# Patient Record
Sex: FEMALE | Race: BLACK OR AFRICAN AMERICAN | Employment: FULL TIME | URBAN - METROPOLITAN AREA
[De-identification: names, ages, dates, MRNs, and addresses within clinical notes are randomized per-mention and may not be internally consistent; named-entity substitution may affect disease eponyms.]

---

## 2020-11-03 ENCOUNTER — HOSPITAL ENCOUNTER (EMERGENCY)
Age: 30
Discharge: HOME | End: 2020-11-03
Attending: EMERGENCY MEDICINE
Payer: COMMERCIAL

## 2020-11-03 VITALS
DIASTOLIC BLOOD PRESSURE: 67 MMHG | HEIGHT: 65 IN | TEMPERATURE: 98.8 F | HEART RATE: 84 BPM | RESPIRATION RATE: 16 BRPM | SYSTOLIC BLOOD PRESSURE: 120 MMHG | OXYGEN SATURATION: 98 %

## 2020-11-03 PROCEDURE — 99283 EMERGENCY DEPT VISIT LOW MDM: CPT

## 2020-11-03 SDOH — HEALTH STABILITY: MENTAL HEALTH: HOW OFTEN DO YOU HAVE A DRINK CONTAINING ALCOHOL?: NEVER

## 2020-11-03 NOTE — ED PROVIDER NOTES
CHIEF COMPLAINT  Other (pt states yesterday her arm started to feel as if she slept on it wrong \"it feels cold but its not cold\" and feeling hasnt gone away since started yesterday. pt has normal feeling and movement)      HISTORY OF PRESENT ILLNESS  Santana Aggarwal is a 35 y.o. female who is right-handed, who presents to the ED complaining of feeling as if her right arm from her elbow to the medial side of her right arm and hand feels as if it is cold but does not feel cold it is it feels like she is asleep like she slept on it. It comes and goes. Sometimes she has the feeling in her shoulder. She has no weakness and no pain with movement. Denies injury. She works as a manager at [employer]. No other complaints, modifying factors or associated symptoms. Nursing notes reviewed. No past medical history on file. No past surgical history on file. No family history on file.   Social History     Socioeconomic History    Marital status: Not on file     Spouse name: Not on file    Number of children: Not on file    Years of education: Not on file    Highest education level: Not on file   Occupational History    Not on file   Social Needs    Financial resource strain: Not on file    Food insecurity     Worry: Not on file     Inability: Not on file    Transportation needs     Medical: Not on file     Non-medical: Not on file   Tobacco Use    Smoking status: Not on file   Substance and Sexual Activity    Alcohol use: Not on file    Drug use: Not on file    Sexual activity: Not on file   Lifestyle    Physical activity     Days per week: Not on file     Minutes per session: Not on file    Stress: Not on file   Relationships    Social connections     Talks on phone: Not on file     Gets together: Not on file     Attends Buddhist service: Not on file     Active member of club or organization: Not on file     Attends meetings of clubs or organizations: Not on file     Relationship status: Not on file    Intimate partner violence     Fear of current or ex partner: Not on file     Emotionally abused: Not on file     Physically abused: Not on file     Forced sexual activity: Not on file   Other Topics Concern    Not on file   Social History Narrative    Not on file     No current facility-administered medications for this encounter. No current outpatient medications on file. No Known Allergies    REVIEW OF SYSTEMS  6 systems reviewed, pertinent positives per HPI otherwise noted to be negative    PHYSICAL EXAM  BP (!) 112/59   Pulse 83   Temp 98.8 °F (37.1 °C)   Resp 16   Ht 5' 5\" (1.651 m)   SpO2 98%   GENERAL APPEARANCE: Awake and alert. Cooperative. No acute distress. HEAD: Normocephalic. Atraumatic. EYES: PERRL. EOM's grossly intact. ENT: Mucous membranes are moist.   NECK: Supple. Normal ROM. HEART: RRR,  no murmur/rubs/gallop  CHEST/LUNGS: Breathing is unlabored. Speaking comfortably in full sentences. EXTREMITIES: .  Positive Phalen's and Tinel's on exam with significant exacerbation of symptoms of palpation of ulnar nerve distribution of the right wrist as well as upon palpation and tapping of the ulnar nerve distribution at the right elbow. . No acute deformities. All extremities neurovascularly intact. SKIN: Warm and dry. NEUROLOGICAL: Alert and oriented. Normal coordination. Gait normal.       ED COURSE/MDM  Patient seen and evaluated. We will give the patient a wrist brace tear to wear home, positive Phalen's and Tinel's on exam with significant exacerbation of symptoms on palpation over the distribution ulnar nerve in the right wrist.  I do feel patient can be safely discharged to home. Recommend follow up with PCP in 2-3 days for re-evaluation. Reasons to RT ED discussed. Patient expresses understanding and is in agreement with plan. CLINICAL IMPRESSION  1.  Cubital tunnel syndrome on right        Blood pressure (!) 112/59, pulse 83, temperature 98.8 °F (37.1 °C), resp. rate 16, height 5' 5\" (1.651 m), SpO2 98 %. DISPOSITION  Patient was discharged to home in good condition. This chart was generated in part by using Dragon Dictation system and may contain errors related to that system including errors in grammar, punctuation, and spelling, as well as words and phrases that may be inappropriate. When dictating, effort is made to correct spelling/grammar errors. If there are any questions or concerns please feel free to contact the dictating provider for clarification.      Reyna Crowell,   ATTENDING, [address], DO  11/03/20 Babak Frost, DO  11/03/20 [id number]

## 2020-11-04 ENCOUNTER — TELEPHONE (OUTPATIENT)
Dept: OTHER | Age: 30
End: 2020-11-04

## 2020-11-04 NOTE — TELEPHONE ENCOUNTER
Patient called requesting PCP,  SW called physician referral line and obtained appointment for next Monday November 9th at 1pm-  Text information to patient.

## 2020-11-06 ENCOUNTER — OFFICE VISIT (OUTPATIENT)
Dept: ORTHOPEDIC SURGERY | Age: 30
End: 2020-11-06
Payer: COMMERCIAL

## 2020-11-06 VITALS — HEIGHT: 65 IN

## 2020-11-06 PROCEDURE — G8484 FLU IMMUNIZE NO ADMIN: HCPCS | Performed by: PHYSICIAN ASSISTANT

## 2020-11-06 PROCEDURE — 99203 OFFICE O/P NEW LOW 30 MIN: CPT | Performed by: PHYSICIAN ASSISTANT

## 2020-11-06 PROCEDURE — G8421 BMI NOT CALCULATED: HCPCS | Performed by: PHYSICIAN ASSISTANT

## 2020-11-06 PROCEDURE — 1036F TOBACCO NON-USER: CPT | Performed by: PHYSICIAN ASSISTANT

## 2020-11-06 PROCEDURE — G8427 DOCREV CUR MEDS BY ELIG CLIN: HCPCS | Performed by: PHYSICIAN ASSISTANT

## 2020-11-06 NOTE — PROGRESS NOTES
Ms. Cha Frost is a 35 y.o. right handed woman  who is seen today in Hand Surgical Consultation at the request of No primary care provider on file. .    She presents today regarding Right symptoms which have been present for approximately 5 days. A history of antecedent trauma or injury is Absent. She reports symptoms to include moderate numbness & tingling in the Ulnar Innervated Digits. Neurologic symptoms do Frequently awaken her from sleep. She reports no pain located in the Medial right elbow, with retrograde radiation . Symptoms are worsening over time. Previous treatment has included conservative measures and splinting of the right wrist.  She does not claim relation of her symptoms to her required work activities. She has not undergone electrodiagnostic testing. I have today reviewed with Cha Frost the clinically relevant, past medical history, medications, allergies,  family history, social history, and Review Of Systems & I have documented any details relevant to today's presenting complaints in my history above. Ms. Leyda Ga self-reported past medical history, medications, allergies,  family history, social history, and Review Of Systems have been scanned into the chart under the \"Media\" tab. Physical Exam:  Ms. Leyda Ga most recent vitals:  Vitals  Height: 5' 5\" (165.1 cm)    She is well nourished, oriented to person, place & time. She demonstrates appropriate mood and affect as well as normal gait and station. Skin: Normal in appearance, Normal Color and Free of Lesions Bilaterally   Digital range of motion is Full and equal bilaterally bilaterally  Wrist range of motion is Full and equal bilaterally bilaterally  Elbow range of motion is Full and equal bilaterally bilaterally  Sensation is subjectively tingling in the Ulnar Innervated Digits and objectively present in the same distribution on the Right, normal on the Left.   All other digits show normal sensation  Vascular examination reveals normal, good capillary refill and good color bilaterally  Swelling is minimal about the elbow on the Right, normal on the Left  There is no evidence of gross joint instability bilaterally. Muscular strength is clinically appropriate bilaterally. Examination for Cubital Tunnel Syndrome on the right shows mild tenderness to palpation at the Medial epicondyle. The Ulnar Nerve rests behind the medial epicondyle without subluxation upon elbow flexion. Elbow flexion-compression test is Negative, and there is an active Tinnel's Sign over the Cubital Tunnel . The Ulnar Nerve innervated intrinsic musculature is not atrophied & weakened. Examination for Carpal Tunnel Syndrome shows Carpal Tunnel Compression Test to be Mildly Positive on the right & Negative on the left. The patient displays no baseline symptoms to potentially confound the exam.  The thenar musculature is not atrophied & weakened. Examination for Stenosing Tenosynovitis demonstrates no evidence of tenderness, thickening or nodularity at the A-1 pulleys of the digits bilaterally. There no palpable triggering or any finger or thumb. Impression:  Ms. Rolando Ward is showing clinical evidence of Ulnar Nerve entrapment at the Cubital Tunnel and presents requesting further treatment. Plan:    I have had a thorough discussion with Ms. Rolando Ward regarding the treatment options available for her initially presenting right  cubital tunnel syndrome, which is causing her significant symptoms and difficulty. I have outlined for Ms. Rolando Ward the risk, benefits and consequences of the various treatment modalities, including a reasonable expectation for the long term success of each. We have discussed the likelihood that further, more aggressive treatment may be required for her current presenting condition.   Based upon our current discussion and a reasonable understating of the options available to her, Ms. Annia Ellsworth has selected to proceed with a conservative plan of treatment consisting of: attention to elbow positioning and pressure,  activity modification, and the judicious use of over-the-counter anti-inflammatory medications if allowed by her primary care physician. Instructions were given regarding the use of other modalities as appropriate. I have clearly explained to her that the above outlined treatment plan should not be expected to 'cure' her  cubital tunnel syndrome, but we are rather treating the symptoms with which she presents. She has understood that in order to achieve more durable relief of her symptoms and to prevent future worsening or further damage, that definitive surgical treatment would be required. Ms. Annia Ellsworth  voiced an appropriate understanding of our discussion, the options available to her, and of the expectations of her selected  treatment. I will ask Ms. Annia Ellsworth. to undergo electrodiagnostic studies of the symptomatic right side. I will ask that she schedule a follow-up appointment with me to review the results of this study after it has been completed. I have also discussed with Ms. Anina Ellsworth  the other treatment options available to her  for this condition. We have today selected to proceed with conservative management. She and I have agreed that if our current course of conservative treatment does not prove to be effective over the short term future, that she will schedule a follow-up appointment to discuss and select an alternate course of therapy including possibly injection or surgical treatment. Ms. Annia Ellsworth has been given a full verbal list of instructions and precautions related to her present condition. I have asked her to followup with me in the office at the prescribed time.  She is also specifically requested to call or return to the office sooner if her symptoms change or worsen prior to the next scheduled appointment.

## 2020-11-09 ENCOUNTER — OFFICE VISIT (OUTPATIENT)
Dept: INTERNAL MEDICINE CLINIC | Age: 30
End: 2020-11-09
Payer: COMMERCIAL

## 2020-11-09 VITALS
DIASTOLIC BLOOD PRESSURE: 61 MMHG | OXYGEN SATURATION: 96 % | SYSTOLIC BLOOD PRESSURE: 101 MMHG | BODY MASS INDEX: 32.46 KG/M2 | WEIGHT: 194.8 LBS | RESPIRATION RATE: 16 BRPM | TEMPERATURE: 98.4 F | HEIGHT: 65 IN | HEART RATE: 75 BPM

## 2020-11-09 PROBLEM — M54.41 CHRONIC MIDLINE LOW BACK PAIN WITH BILATERAL SCIATICA: Status: ACTIVE | Noted: 2020-11-09

## 2020-11-09 PROBLEM — G89.29 CHRONIC MIDLINE LOW BACK PAIN WITH BILATERAL SCIATICA: Status: ACTIVE | Noted: 2020-11-09

## 2020-11-09 PROBLEM — E66.811 CLASS 1 OBESITY DUE TO EXCESS CALORIES WITHOUT SERIOUS COMORBIDITY WITH BODY MASS INDEX (BMI) OF 32.0 TO 32.9 IN ADULT: Status: ACTIVE | Noted: 2020-11-09

## 2020-11-09 PROBLEM — M54.42 CHRONIC MIDLINE LOW BACK PAIN WITH BILATERAL SCIATICA: Status: ACTIVE | Noted: 2020-11-09

## 2020-11-09 PROBLEM — F41.1 GENERALIZED ANXIETY DISORDER: Status: ACTIVE | Noted: 2020-11-09

## 2020-11-09 PROBLEM — E66.09 CLASS 1 OBESITY DUE TO EXCESS CALORIES WITHOUT SERIOUS COMORBIDITY WITH BODY MASS INDEX (BMI) OF 32.0 TO 32.9 IN ADULT: Status: ACTIVE | Noted: 2020-11-09

## 2020-11-09 PROCEDURE — G8427 DOCREV CUR MEDS BY ELIG CLIN: HCPCS | Performed by: INTERNAL MEDICINE

## 2020-11-09 PROCEDURE — 1036F TOBACCO NON-USER: CPT | Performed by: INTERNAL MEDICINE

## 2020-11-09 PROCEDURE — G8417 CALC BMI ABV UP PARAM F/U: HCPCS | Performed by: INTERNAL MEDICINE

## 2020-11-09 PROCEDURE — G8484 FLU IMMUNIZE NO ADMIN: HCPCS | Performed by: INTERNAL MEDICINE

## 2020-11-09 PROCEDURE — 99204 OFFICE O/P NEW MOD 45 MIN: CPT | Performed by: INTERNAL MEDICINE

## 2020-11-09 RX ORDER — SERTRALINE HYDROCHLORIDE 25 MG/1
TABLET, FILM COATED ORAL
Qty: 60 TABLET | Refills: 1 | Status: SHIPPED | OUTPATIENT
Start: 2020-11-21 | End: 2021-01-18 | Stop reason: SDUPTHER

## 2020-11-09 RX ORDER — NAPROXEN 250 MG/1
250 TABLET ORAL 2 TIMES DAILY WITH MEALS
Qty: 20 TABLET | Refills: 0 | Status: SHIPPED | OUTPATIENT
Start: 2020-11-09 | End: 2021-01-18

## 2020-11-09 SDOH — ECONOMIC STABILITY: INCOME INSECURITY: HOW HARD IS IT FOR YOU TO PAY FOR THE VERY BASICS LIKE FOOD, HOUSING, MEDICAL CARE, AND HEATING?: SOMEWHAT HARD

## 2020-11-09 SDOH — ECONOMIC STABILITY: FOOD INSECURITY: WITHIN THE PAST 12 MONTHS, THE FOOD YOU BOUGHT JUST DIDN'T LAST AND YOU DIDN'T HAVE MONEY TO GET MORE.: NEVER TRUE

## 2020-11-09 SDOH — ECONOMIC STABILITY: FOOD INSECURITY: WITHIN THE PAST 12 MONTHS, YOU WORRIED THAT YOUR FOOD WOULD RUN OUT BEFORE YOU GOT MONEY TO BUY MORE.: NEVER TRUE

## 2020-11-09 SDOH — ECONOMIC STABILITY: TRANSPORTATION INSECURITY
IN THE PAST 12 MONTHS, HAS LACK OF TRANSPORTATION KEPT YOU FROM MEETINGS, WORK, OR FROM GETTING THINGS NEEDED FOR DAILY LIVING?: NO

## 2020-11-09 SDOH — ECONOMIC STABILITY: TRANSPORTATION INSECURITY
IN THE PAST 12 MONTHS, HAS THE LACK OF TRANSPORTATION KEPT YOU FROM MEDICAL APPOINTMENTS OR FROM GETTING MEDICATIONS?: NO

## 2020-11-09 ASSESSMENT — ENCOUNTER SYMPTOMS
COUGH: 0
BACK PAIN: 1
BLOOD IN STOOL: 0
SINUS PAIN: 0
RHINORRHEA: 0
NAUSEA: 0
WHEEZING: 0
ABDOMINAL DISTENTION: 0
VOMITING: 0
ABDOMINAL PAIN: 0
EYE PAIN: 0
DIARRHEA: 0
EYE DISCHARGE: 0
CHEST TIGHTNESS: 0
PHOTOPHOBIA: 0
SHORTNESS OF BREATH: 0
CONSTIPATION: 0

## 2020-11-09 ASSESSMENT — PATIENT HEALTH QUESTIONNAIRE - PHQ9
2. FEELING DOWN, DEPRESSED OR HOPELESS: 1
1. LITTLE INTEREST OR PLEASURE IN DOING THINGS: 0
SUM OF ALL RESPONSES TO PHQ9 QUESTIONS 1 & 2: 1
SUM OF ALL RESPONSES TO PHQ QUESTIONS 1-9: 1

## 2021-01-17 NOTE — PROGRESS NOTES
2021    TELEHEALTH EVALUATION -- Audio/Visual (During [facility]-59 public health emergency)    HPI:    Lawson Loza (:  1990) has requested an audio/video evaluation for the following concern(s):    The pt is a 35YOF with PMH of obesity, anxiety and low back pain is being seen via virtual visit to follow up on her anxiety and other health issues  She works as customer service as a manager. No smoking, occiosnal marijuana use. ETOH social. Living at home with her kids. She is sexally acitve uses condoms. LMP mid October. Generalized Anxiety disorder  The pt has this problem since . Fluctuating worse with COVID situation  The pt has persistent worrying and stress  about every day life situations and minor issues. Poor sleep, fatigue, difficulty relaxing. She was started on sertraline and feel she is much improved then before. She still however feels that she can be better and there is room for improvement in terms of her symptoms. She does get aggravated quickly. Lower Back pain  For seven years, better with naproxen. It is much I,mporved and she is able to function normally  Pain stated as tightness in the middle and then radiates down to he back of legs on either side. Feels like bhavesh horse, 10/10, sharp and shooting, It is made better by ibuprofen to some extent. It is made worse by cold. She works standing up and on her feet a a lot which also worsens it. Sitting also puts pressure and can bring it on. No injury  Or tauma nio heavy weight lifting. Arm numbness new problem worsening  Started about a month back and she went to the Ed as she felt that her forearm below the elbow was numb. An EMG was ordered but she has not been able to get it done. She does repeated hand movement at work which include cutting and working at [employer]. She says she sleep with her hand under her face and when she does that her numbness gets worse and also with work.  It is better in certain positions History     Tobacco Use    Smoking status: Never Smoker    Smokeless tobacco: Never Used   Substance Use Topics    Alcohol use: Yes     Frequency: Never     Comment: social and occ     Drug use: Yes     Types: Marijuana     Comment: occ        No Known Allergies, No past medical history on file., No past surgical history on file.,   Social History     Tobacco Use    Smoking status: Never Smoker    Smokeless tobacco: Never Used   Substance Use Topics    Alcohol use: Yes     Frequency: Never     Comment: social and occ     Drug use: Yes     Types: Marijuana     Comment: occ   , No family history on file.,   There is no immunization history on file for this patient.,   Health Maintenance   Topic Date Due    Hepatitis C screen  1990    Varicella vaccine (1 of 2 - 2-dose childhood series) 09/16/1992    HIV screen  09/16/2006    Cervical cancer screen  09/16/2012    Flu vaccine (1) 11/09/2021 (Originally 9/1/2020)    DTaP/Tdap/Td vaccine (2 - Td) 06/25/2029    Hepatitis A vaccine  Aged Out    Hepatitis B vaccine  Aged Out    Hib vaccine  Aged Out    Meningococcal (ACWY) vaccine  Aged Out    Pneumococcal 0-64 years Vaccine  Aged Out       PHYSICAL EXAMINATION:  [ INSTRUCTIONS:  \"[x]\" Indicates a positive item  \"[]\" Indicates a negative item  -- DELETE ALL ITEMS NOT EXAMINED]  Vital Signs: (As obtained by patient/caregiver or practitioner observation)    Blood pressure-  Heart rate-    Respiratory rate-    Temperature-  Pulse oximetry-     Constitutional: [x] Appears well-developed and well-nourished [x] No apparent distress      [] Abnormal-   Mental status  [x] Alert and awake  [x] Oriented to person/place/time [x]Able to follow commands      Eyes:  EOM    [x]  Normal  [] Abnormal-  Sclera  [x]  Normal  [] Abnormal -         Discharge [x]  None visible  [] Abnormal -    HENT:   [x] Normocephalic, atraumatic.   [] Abnormal   [x] Mouth/Throat: Mucous membranes are moist.     External Ears [x] Normal [] Abnormal-     Neck: [x] No visualized mass     Pulmonary/Chest: [x] Respiratory effort normal.  [x] No visualized signs of difficulty breathing or respiratory distress        [] Abnormal-      Musculoskeletal:   [x] Normal gait with no signs of ataxia         [x] Normal range of motion of neck        [] Abnormal-       Neurological:        [x] No Facial Asymmetry (Cranial nerve 7 motor function) (limited exam to video visit)          [x] No gaze palsy        [] Abnormal-         Skin:        [x] No significant exanthematous lesions or discoloration noted on facial skin         [] Abnormal-            Psychiatric:       [x] Normal Affect [x] No Hallucinations        [] Abnormal-     Other pertinent observable physical exam findings-     ASSESSMENT/PLAN:  1. Generalized anxiety disorder  Adjusted the dose of the sertraline to 75 mg daily    2. Chronic midline low back pain with bilateral sciatica  Prn naproxen. The pt was able to tolerate it in the past  She does need blood tested will do it on her next visit    3. Numbness of upper extremity  EMG pending prn naproxen      Return in about 2 months (around 3/18/2021) for preventive visit. Tian Ricks is a 35 y.o. female being evaluated by a Virtual Visit (video visit) encounter to address concerns as mentioned above. A caregiver was present when appropriate. Due to this being a TeleHealth encounter (During [facility]-02 public health emergency), evaluation of the following organ systems was limited: Vitals/Constitutional/EENT/Resp/CV/GI//MS/Neuro/Skin/Heme-Lymph-Imm. Pursuant to the emergency declaration under the [address] [place] [address], [address] [place] [address] authority and the [employer] Act, this Virtual Visit was conducted with patient's (and/or legal guardian's) consent, to reduce the patient's risk of exposure to COVID-19 and provide necessary medical care.   The patient (and/or legal guardian) has also been advised to contact this office for worsening conditions or problems, and seek emergency medical treatment and/or call 911 if deemed necessary. Patient identification was verified at the start of the visit: Yes    Total time spent on this encounter: 30 min    Services were provided through a video synchronous discussion virtually to substitute for in-person clinic visit. Patient and provider were located at their individual homes. --Javier Lerma MD on 1/18/2021 at 10:57 AM    An electronic signature was used to authenticate this note.

## 2021-01-18 ENCOUNTER — VIRTUAL VISIT (OUTPATIENT)
Dept: INTERNAL MEDICINE CLINIC | Age: 31
End: 2021-01-18
Payer: COMMERCIAL

## 2021-01-18 ENCOUNTER — TELEPHONE (OUTPATIENT)
Dept: INTERNAL MEDICINE CLINIC | Age: 31
End: 2021-01-18

## 2021-01-18 DIAGNOSIS — G89.29 CHRONIC MIDLINE LOW BACK PAIN WITH BILATERAL SCIATICA: ICD-10-CM

## 2021-01-18 DIAGNOSIS — F41.1 GENERALIZED ANXIETY DISORDER: Primary | ICD-10-CM

## 2021-01-18 DIAGNOSIS — R20.0 NUMBNESS OF UPPER EXTREMITY: ICD-10-CM

## 2021-01-18 DIAGNOSIS — M54.41 CHRONIC MIDLINE LOW BACK PAIN WITH BILATERAL SCIATICA: ICD-10-CM

## 2021-01-18 DIAGNOSIS — M54.42 CHRONIC MIDLINE LOW BACK PAIN WITH BILATERAL SCIATICA: ICD-10-CM

## 2021-01-18 PROCEDURE — 1036F TOBACCO NON-USER: CPT | Performed by: INTERNAL MEDICINE

## 2021-01-18 PROCEDURE — G8417 CALC BMI ABV UP PARAM F/U: HCPCS | Performed by: INTERNAL MEDICINE

## 2021-01-18 PROCEDURE — G8427 DOCREV CUR MEDS BY ELIG CLIN: HCPCS | Performed by: INTERNAL MEDICINE

## 2021-01-18 PROCEDURE — G8484 FLU IMMUNIZE NO ADMIN: HCPCS | Performed by: INTERNAL MEDICINE

## 2021-01-18 PROCEDURE — 99214 OFFICE O/P EST MOD 30 MIN: CPT | Performed by: INTERNAL MEDICINE

## 2021-01-18 RX ORDER — M-VIT,TX,IRON,MINS/CALC/FOLIC 27MG-0.4MG
1 TABLET ORAL DAILY
COMMUNITY

## 2021-01-18 RX ORDER — NAPROXEN 250 MG/1
250 TABLET ORAL 2 TIMES DAILY PRN
Qty: 60 TABLET | Refills: 1 | Status: SHIPPED | OUTPATIENT
Start: 2021-01-18

## 2021-01-18 RX ORDER — SERTRALINE HYDROCHLORIDE 25 MG/1
75 TABLET, FILM COATED ORAL DAILY
Qty: 90 TABLET | Refills: 2 | Status: SHIPPED | OUTPATIENT
Start: 2021-01-18

## 2021-01-18 ASSESSMENT — ENCOUNTER SYMPTOMS
WHEEZING: 0
ABDOMINAL DISTENTION: 0
CHEST TIGHTNESS: 0
PHOTOPHOBIA: 0
VOMITING: 0
SHORTNESS OF BREATH: 0
NAUSEA: 0
SINUS PAIN: 0
BACK PAIN: 0
BLOOD IN STOOL: 0
EYE PAIN: 0
ABDOMINAL PAIN: 0
COUGH: 0
RHINORRHEA: 0
CONSTIPATION: 0
DIARRHEA: 0
EYE DISCHARGE: 0

## 2021-01-18 ASSESSMENT — PATIENT HEALTH QUESTIONNAIRE - PHQ9
SUM OF ALL RESPONSES TO PHQ QUESTIONS 1-9: 1
SUM OF ALL RESPONSES TO PHQ QUESTIONS 1-9: 1
2. FEELING DOWN, DEPRESSED OR HOPELESS: 1

## 2024-12-11 ENCOUNTER — OFFICE VISIT (OUTPATIENT)
Age: 34
End: 2024-12-11

## 2024-12-11 VITALS
WEIGHT: 234.9 LBS | DIASTOLIC BLOOD PRESSURE: 79 MMHG | SYSTOLIC BLOOD PRESSURE: 129 MMHG | TEMPERATURE: 98 F | BODY MASS INDEX: 39.14 KG/M2 | HEIGHT: 65 IN | HEART RATE: 106 BPM | OXYGEN SATURATION: 97 %

## 2024-12-11 DIAGNOSIS — J01.00 ACUTE MAXILLARY SINUSITIS, RECURRENCE NOT SPECIFIED: Primary | ICD-10-CM

## 2024-12-11 RX ORDER — FLUTICASONE PROPIONATE 50 MCG
1 SPRAY, SUSPENSION (ML) NASAL DAILY
Qty: 16 G | Refills: 0 | Status: SHIPPED | OUTPATIENT
Start: 2024-12-11

## 2024-12-11 ASSESSMENT — ENCOUNTER SYMPTOMS: SINUS PRESSURE: 1

## 2024-12-11 NOTE — PROGRESS NOTES
Ramos Pacheco (:  1990) is a 34 y.o. female,New patient, here for evaluation of the following chief complaint(s):  Migraine (Migraine since Thursday) and Ear Pain (Right ear pain for five days )      ASSESSMENT/PLAN:  1. Acute maxillary sinusitis, recurrence not specified  - amoxicillin-clavulanate (AUGMENTIN) 875-125 MG per tablet; Take 1 tablet by mouth 2 times daily for 7 days  Dispense: 14 tablet; Refill: 0  - fluticasone (FLONASE) 50 MCG/ACT nasal spray; 1 spray by Each Nostril route daily  Dispense: 16 g; Refill: 0       Return if symptoms worsen or fail to improve.    SUBJECTIVE/OBJECTIVE:  PRESENT TO CLINIC WITH RIGHT EAR PAIN AND MIGRAINE FOR FIVE DAYS. NO FEVER.      History provided by:  Patient      Vitals:    24 1004   BP: 129/79   Site: Left Upper Arm   Position: Sitting   Cuff Size: Large Adult   Pulse: (!) 106   Temp: 98 °F (36.7 °C)   TempSrc: Oral   SpO2: 97%   Weight: 106.5 kg (234 lb 14.4 oz)   Height: 1.651 m (5' 5\")       Review of Systems   HENT:  Positive for congestion, ear pain and sinus pressure.        Physical Exam  Constitutional:       Appearance: Normal appearance.   HENT:      Head: Normocephalic and atraumatic.      Nose: Nose normal.      Mouth/Throat:      Mouth: Mucous membranes are moist.   Eyes:      Pupils: Pupils are equal, round, and reactive to light.   Pulmonary:      Effort: Pulmonary effort is normal.      Breath sounds: Normal breath sounds.   Musculoskeletal:         General: Normal range of motion.      Cervical back: Normal range of motion and neck supple.   Skin:     General: Skin is warm.   Neurological:      Mental Status: She is alert and oriented to person, place, and time.   Psychiatric:         Mood and Affect: Mood normal.         Behavior: Behavior normal.           An electronic signature was used to authenticate this note.    --Agnes Juarez DO